# Patient Record
Sex: FEMALE | Race: WHITE | Employment: OTHER | URBAN - METROPOLITAN AREA
[De-identification: names, ages, dates, MRNs, and addresses within clinical notes are randomized per-mention and may not be internally consistent; named-entity substitution may affect disease eponyms.]

---

## 2019-01-15 ENCOUNTER — HOSPITAL ENCOUNTER (OUTPATIENT)
Dept: ULTRASOUND IMAGING | Age: 49
Discharge: HOME | End: 2019-01-15
Payer: COMMERCIAL

## 2019-01-15 DIAGNOSIS — N95.1 SYMPTOMATIC MENOPAUSAL OR FEMALE CLIMACTERIC STATES: ICD-10-CM

## 2019-01-15 DIAGNOSIS — R73.9 BLOOD GLUCOSE ELEVATED: ICD-10-CM

## 2019-01-15 DIAGNOSIS — Z87.39 PERSONAL HISTORY OF ARTHRITIS: ICD-10-CM

## 2019-01-15 PROCEDURE — 76700 US EXAM ABDOM COMPLETE: CPT

## 2020-05-12 ENCOUNTER — OFFICE VISIT (OUTPATIENT)
Dept: ORTHOPEDIC SURGERY | Age: 50
End: 2020-05-12
Payer: COMMERCIAL

## 2020-05-12 VITALS — WEIGHT: 170 LBS | BODY MASS INDEX: 26.68 KG/M2 | HEIGHT: 67 IN

## 2020-05-12 PROCEDURE — 99203 OFFICE O/P NEW LOW 30 MIN: CPT | Performed by: PODIATRIST

## 2020-05-12 PROCEDURE — L4361 PNEUMA/VAC WALK BOOT PRE OTS: HCPCS | Performed by: PODIATRIST

## 2020-05-12 NOTE — PROGRESS NOTES
HISTORY OF PRESENT ILLNESS: This is an initial visit for a 47-year-old female with a chief complaint of pain to the left foot. She states that approximately 6 weeks ago she awoke with pain to the left foot. She has had pain since with most activities. She cannot recall any direct injury to the foot. There is pain with pressure to top of the foot and with weightbearing. This is relieved mainly with getting off of the foot. FAMILY HISTORY:  Documented in chart. SOCIAL HISTORY:  Documented in chart. REVIEW OF SYSTEMS: The patient denies any fever, chills, or night sweats. The patient also denies developing any type of rash. The patient denies any problems with cardiovascular, pulmonary, gastrointestinal, neurologic, urologic, genitourinary, psychiatric, dermatologic, and HEENT systems. PHYSICAL EXAM:  There is mild focal edema to the left foot with no ecchymosis. The majority of her palpable tenderness is at the dorsal aspect of the left second metatarsal midshaft. No open lesions or fracture blisters are noted. She has palpable pedal pulses bilateral.  Her sensation is grossly intact bilateral.  The remainder of the exam is unremarkable. X-RAYS:  Three weightbearing x-ray views of the left foot were taken today. These demonstrates a cortical thickening in the proximal diaphyseal region of the second metatarsal on the dorsal aspect. No definitive cortical breach is noted. ASSESSMENT: Second metatarsal stress fracture, left foot. PLAN:  I educated the patient on the pathology and its treatment options. The x-rays were reviewed with the patient. A high tide walker was applied to the left lower extremity. Weightbearing will be as tolerated. Rest, ice, and elevation will be used to reduce pain. An ACE wrap will be used to control swelling. Delayed union and non-union of the fracture was discussed. Overall activity is to be decreased to control pain.       A return

## 2020-05-14 ENCOUNTER — TELEPHONE (OUTPATIENT)
Dept: ORTHOPEDIC SURGERY | Age: 50
End: 2020-05-14

## 2020-05-14 NOTE — TELEPHONE ENCOUNTER
05/14/2020 [facility]   NO AUTHORIZATION REQUIRED IF UNDER $1200. VALID & BILLABLE. PER ERNESTO @ [facility] [employer], CALL REF [id number].   AP

## 2020-05-26 ENCOUNTER — OFFICE VISIT (OUTPATIENT)
Dept: ORTHOPEDIC SURGERY | Age: 50
End: 2020-05-26
Payer: COMMERCIAL

## 2020-05-26 VITALS — BODY MASS INDEX: 26.68 KG/M2 | WEIGHT: 170 LBS | HEIGHT: 67 IN

## 2020-05-26 PROCEDURE — 99213 OFFICE O/P EST LOW 20 MIN: CPT | Performed by: PODIATRIST

## 2020-06-09 ENCOUNTER — OFFICE VISIT (OUTPATIENT)
Dept: ORTHOPEDIC SURGERY | Age: 50
End: 2020-06-09
Payer: COMMERCIAL

## 2020-06-09 VITALS — HEIGHT: 67 IN | WEIGHT: 170 LBS | BODY MASS INDEX: 26.68 KG/M2

## 2020-06-09 PROCEDURE — 99213 OFFICE O/P EST LOW 20 MIN: CPT | Performed by: PODIATRIST

## 2020-06-09 NOTE — PROGRESS NOTES
HISTORY OF PRESENT ILLNESS:  This is a followup for a second metatarsal fracture of the left foot. The patient is having less pain. PHYSICAL EXAM:  There is less edema and there is no ecchymosis. There is no pain on palpation over the second metatarsal base fracture site. Pedal pulses are palpable bilateral.  Sensation is grossly intact bilateral.    X-RAYS:  Three weightbearing views of the left foot were taken. No acute periosteal reactions or changes are noted in the area of the second metatarsal base spur. No cortical breach is noted. ASSESSMENT: Second metatarsal Fracture, left foot    PLAN:  I reveiwed the x-rays with the patient. She will stay in the boot for another 2 weeks and then try to transition back into regular shoe. I will see her back in 3 weeks and please take new x-rays.

## 2020-06-30 ENCOUNTER — OFFICE VISIT (OUTPATIENT)
Dept: ORTHOPEDIC SURGERY | Age: 50
End: 2020-06-30
Payer: COMMERCIAL

## 2020-06-30 VITALS — BODY MASS INDEX: 26.68 KG/M2 | WEIGHT: 170 LBS | HEIGHT: 67 IN

## 2020-06-30 PROCEDURE — 99213 OFFICE O/P EST LOW 20 MIN: CPT | Performed by: PODIATRIST

## 2020-06-30 RX ORDER — NAPROXEN 375 MG/1
375 TABLET ORAL 2 TIMES DAILY WITH MEALS
Qty: 10 TABLET | Refills: 0 | Status: SHIPPED | OUTPATIENT
Start: 2020-06-30 | End: 2020-07-05